# Patient Record
Sex: FEMALE | Race: WHITE | Employment: OTHER | URBAN - METROPOLITAN AREA
[De-identification: names, ages, dates, MRNs, and addresses within clinical notes are randomized per-mention and may not be internally consistent; named-entity substitution may affect disease eponyms.]

---

## 2017-04-06 ENCOUNTER — OFFICE VISIT (OUTPATIENT)
Dept: SURGERY | Age: 82
End: 2017-04-06

## 2017-04-06 VITALS
SYSTOLIC BLOOD PRESSURE: 110 MMHG | WEIGHT: 132 LBS | BODY MASS INDEX: 30.55 KG/M2 | HEIGHT: 55 IN | RESPIRATION RATE: 14 BRPM | TEMPERATURE: 98 F | DIASTOLIC BLOOD PRESSURE: 60 MMHG | HEART RATE: 72 BPM

## 2017-04-06 DIAGNOSIS — C50.919 MALIGNANT NEOPLASM OF BREAST (FEMALE), UNSPECIFIED SITE: Primary | ICD-10-CM

## 2017-04-06 PROCEDURE — 99213 OFFICE O/P EST LOW 20 MIN: CPT | Performed by: SURGERY

## 2017-04-06 ASSESSMENT — ENCOUNTER SYMPTOMS
CONSTIPATION: 0
BACK PAIN: 0
VOMITING: 0
COLOR CHANGE: 0
CHEST TIGHTNESS: 0
EYE DISCHARGE: 0
TROUBLE SWALLOWING: 0
ABDOMINAL PAIN: 0
ANAL BLEEDING: 0
STRIDOR: 0
EYE PAIN: 0
SHORTNESS OF BREATH: 0

## 2017-10-18 RX ORDER — TAMOXIFEN CITRATE 10 MG/1
10 TABLET ORAL DAILY
Qty: 90 TABLET | Refills: 1 | Status: SHIPPED | OUTPATIENT
Start: 2017-10-18 | End: 2018-07-10 | Stop reason: SDUPTHER

## 2018-01-09 ENCOUNTER — OFFICE VISIT (OUTPATIENT)
Dept: SURGERY | Age: 83
End: 2018-01-09

## 2018-01-09 VITALS
DIASTOLIC BLOOD PRESSURE: 60 MMHG | BODY MASS INDEX: 30.55 KG/M2 | HEIGHT: 55 IN | RESPIRATION RATE: 12 BRPM | SYSTOLIC BLOOD PRESSURE: 100 MMHG | WEIGHT: 132 LBS | HEART RATE: 72 BPM

## 2018-01-09 DIAGNOSIS — Z85.3 HISTORY OF LEFT BREAST CANCER: Primary | ICD-10-CM

## 2018-01-09 PROCEDURE — 99213 OFFICE O/P EST LOW 20 MIN: CPT | Performed by: SURGERY

## 2018-01-09 ASSESSMENT — ENCOUNTER SYMPTOMS
ABDOMINAL PAIN: 0
COLOR CHANGE: 0
EYE PAIN: 0
CHEST TIGHTNESS: 0
ANAL BLEEDING: 0
TROUBLE SWALLOWING: 0
STRIDOR: 0
SHORTNESS OF BREATH: 0
BACK PAIN: 0
VOMITING: 0
EYE DISCHARGE: 0
CONSTIPATION: 0

## 2018-03-13 ENCOUNTER — TELEPHONE (OUTPATIENT)
Dept: SURGERY | Age: 83
End: 2018-03-13

## 2018-03-13 RX ORDER — TAMOXIFEN CITRATE 10 MG/1
10 TABLET ORAL 2 TIMES DAILY
Qty: 180 TABLET | Refills: 1 | OUTPATIENT
Start: 2018-03-13 | End: 2018-07-10 | Stop reason: SDUPTHER

## 2018-07-10 ENCOUNTER — OFFICE VISIT (OUTPATIENT)
Dept: SURGERY | Age: 83
End: 2018-07-10
Payer: MEDICARE

## 2018-07-10 VITALS
WEIGHT: 133 LBS | BODY MASS INDEX: 30.78 KG/M2 | OXYGEN SATURATION: 93 % | TEMPERATURE: 97.8 F | DIASTOLIC BLOOD PRESSURE: 72 MMHG | HEART RATE: 73 BPM | SYSTOLIC BLOOD PRESSURE: 118 MMHG | HEIGHT: 55 IN

## 2018-07-10 DIAGNOSIS — Z85.3 HISTORY OF LEFT BREAST CANCER: Primary | ICD-10-CM

## 2018-07-10 PROCEDURE — 99213 OFFICE O/P EST LOW 20 MIN: CPT | Performed by: SURGERY

## 2018-07-10 RX ORDER — LISINOPRIL 10 MG/1
10 TABLET ORAL DAILY
COMMUNITY
End: 2019-06-25

## 2018-07-10 ASSESSMENT — ENCOUNTER SYMPTOMS
ANAL BLEEDING: 0
VOMITING: 0
ABDOMINAL PAIN: 0
EYE PAIN: 0
TROUBLE SWALLOWING: 0
CHEST TIGHTNESS: 0
SHORTNESS OF BREATH: 0
EYE DISCHARGE: 0
STRIDOR: 0
BACK PAIN: 0
COLOR CHANGE: 0
CONSTIPATION: 0

## 2018-07-10 NOTE — PROGRESS NOTES
Vidhya Davies      I have reviewed the Medical Assistant's entries in the Past Medical History, Medications, Allergies, Social History and Vital Sign sections      No chief complaint on file. HPI      Her for breast cancer FU      S/p let partial mastectomy   + AVERY      Feels fine    No breast complaitns    Denies breast changes, mass, discharge, node                                                  Past Medical History:   Diagnosis Date    COPD (chronic obstructive pulmonary disease) (HCC)     Dyslipidemia     Dyspnea     GERD (gastroesophageal reflux disease)     Hypertension     Kyphoscoliosis     LBBB (left bundle branch block)      Past Surgical History:   Procedure Laterality Date    APPENDECTOMY      BLADDER SURGERY      CARDIAC DEFIBRILLATOR PLACEMENT      GASTRIC RESTRICTION SURGERY      HYSTERECTOMY           Current problems include  Patient Active Problem List   Diagnosis    Dyspnea    Kyphoscoliosis    COPD (chronic obstructive pulmonary disease) (HCC)    Dyslipidemia    Hypertension    LBBB (left bundle branch block)    Malignant neoplasm of female breast [facility])           Social History     Social History    Marital status:      Spouse name: N/A    Number of children: N/A    Years of education: N/A     Social History Main Topics    Smoking status: Never Smoker    Smokeless tobacco: Never Used    Alcohol use Not on file    Drug use: Unknown    Sexual activity: Not on file     Other Topics Concern    Not on file     Social History Narrative    No narrative on file                 Family History   Problem Relation Age of Onset    Heart Disease Mother     Diabetes Mother     Heart Disease Father          Current Outpatient Prescriptions   Medication Sig Dispense Refill    lisinopril (PRINIVIL;ZESTRIL) 10 MG tablet Take 10 mg by mouth daily      tamoxifen (NOLVADEX) 10 MG chemo tablet Take 1 tablet by mouth 2 times daily.  30 tablet 0    albuterol

## 2018-08-22 RX ORDER — TAMOXIFEN CITRATE 10 MG/1
10 TABLET ORAL 2 TIMES DAILY
Qty: 180 TABLET | Refills: 3 | Status: SHIPPED | OUTPATIENT
Start: 2018-08-22

## 2019-06-25 ENCOUNTER — HOSPITAL ENCOUNTER (OUTPATIENT)
Dept: PREADMISSION TESTING | Age: 84
Discharge: HOME | End: 2019-06-29
Payer: MEDICARE

## 2019-06-25 VITALS
OXYGEN SATURATION: 94 % | HEIGHT: 55 IN | SYSTOLIC BLOOD PRESSURE: 157 MMHG | TEMPERATURE: 98.3 F | BODY MASS INDEX: 30.78 KG/M2 | RESPIRATION RATE: 14 BRPM | DIASTOLIC BLOOD PRESSURE: 69 MMHG | HEART RATE: 65 BPM | WEIGHT: 133 LBS

## 2019-06-25 PROBLEM — H91.93 BILATERAL HEARING LOSS: Status: ACTIVE | Noted: 2019-06-25

## 2019-06-25 LAB
ANION GAP SERPL CALCULATED.3IONS-SCNC: 16 MEQ/L (ref 9–15)
BUN BLDV-MCNC: 27 MG/DL (ref 8–23)
CALCIUM SERPL-MCNC: 8.7 MG/DL (ref 8.5–9.9)
CHLORIDE BLD-SCNC: 96 MEQ/L (ref 95–107)
CO2: 32 MEQ/L (ref 20–31)
CREAT SERPL-MCNC: 0.8 MG/DL (ref 0.5–0.9)
GFR AFRICAN AMERICAN: >60
GFR NON-AFRICAN AMERICAN: >60
GLUCOSE BLD-MCNC: 82 MG/DL (ref 70–99)
HCT VFR BLD CALC: 32.5 % (ref 37–47)
HEMOGLOBIN: 11 G/DL (ref 12–16)
MCH RBC QN AUTO: 30.6 PG (ref 27–31.3)
MCHC RBC AUTO-ENTMCNC: 34 % (ref 33–37)
MCV RBC AUTO: 90.2 FL (ref 82–100)
PDW BLD-RTO: 14.2 % (ref 11.5–14.5)
PLATELET # BLD: 227 K/UL (ref 130–400)
POTASSIUM SERPL-SCNC: 3.9 MEQ/L (ref 3.4–4.9)
RBC # BLD: 3.6 M/UL (ref 4.2–5.4)
SODIUM BLD-SCNC: 144 MEQ/L (ref 135–144)
WBC # BLD: 8.5 K/UL (ref 4.8–10.8)

## 2019-06-25 PROCEDURE — 85027 COMPLETE CBC AUTOMATED: CPT

## 2019-06-25 PROCEDURE — 80048 BASIC METABOLIC PNL TOTAL CA: CPT

## 2019-06-25 RX ORDER — SODIUM CHLORIDE 0.9 % (FLUSH) 0.9 %
10 SYRINGE (ML) INJECTION EVERY 12 HOURS SCHEDULED
Status: CANCELLED | OUTPATIENT
Start: 2019-06-27

## 2019-06-25 RX ORDER — LIDOCAINE 50 MG/G
1 PATCH TOPICAL DAILY
COMMUNITY

## 2019-06-25 RX ORDER — SODIUM CHLORIDE 0.9 % (FLUSH) 0.9 %
10 SYRINGE (ML) INJECTION PRN
Status: CANCELLED | OUTPATIENT
Start: 2019-06-27

## 2019-06-25 RX ORDER — LIDOCAINE HYDROCHLORIDE 10 MG/ML
1 INJECTION, SOLUTION EPIDURAL; INFILTRATION; INTRACAUDAL; PERINEURAL
Status: CANCELLED | OUTPATIENT
Start: 2019-06-27 | End: 2019-06-27

## 2019-06-25 RX ORDER — FUROSEMIDE 20 MG/1
40 TABLET ORAL DAILY
COMMUNITY

## 2019-06-25 RX ORDER — ACETAMINOPHEN 325 MG/1
650 TABLET ORAL EVERY 6 HOURS PRN
COMMUNITY

## 2019-06-25 RX ORDER — SODIUM CHLORIDE, SODIUM LACTATE, POTASSIUM CHLORIDE, CALCIUM CHLORIDE 600; 310; 30; 20 MG/100ML; MG/100ML; MG/100ML; MG/100ML
INJECTION, SOLUTION INTRAVENOUS CONTINUOUS
Status: CANCELLED | OUTPATIENT
Start: 2019-06-27

## 2019-06-25 ASSESSMENT — ENCOUNTER SYMPTOMS
DIARRHEA: 0
CONSTIPATION: 0
BACK PAIN: 0
COUGH: 0
WHEEZING: 0
CHEST TIGHTNESS: 0
STRIDOR: 0
SORE THROAT: 0
NAUSEA: 0
ALLERGIC/IMMUNOLOGIC NEGATIVE: 1
SHORTNESS OF BREATH: 1

## 2019-06-25 NOTE — H&P
Nurse Practitioner History and Physical      CHIEF COMPLAINT:  [place]    HISTORY OF PRESENT ILLNESS:      The patient is a 80 y.o. female with significant past medical history of hearing loss, otitis media, tinnitus who presents for bilateral myringotomy with ventilating tube insertion. Patient is extremely [place]. Does not wear hearing aides. Daughter was with patient for PAT appt. Scheduled for OR    Past Medical History:        Diagnosis Date    CAD (coronary artery disease)     has x 8 stents     COPD (chronic obstructive pulmonary disease) (HCC)     Dyslipidemia     Dyspnea     GERD (gastroesophageal reflux disease)     Hyperlipidemia     meds > 25 yrs    Hypertension     meds > 25 yrs    Kyphoscoliosis     LBBB (left bundle branch block)      Past Surgical History:    Past Surgical History:   Procedure Laterality Date    APPENDECTOMY      BLADDER SURGERY      CARDIAC DEFIBRILLATOR PLACEMENT      GASTRIC RESTRICTION SURGERY      HYSTERECTOMY           Medications Prior to Admission:    Current Outpatient Medications   Medication Sig Dispense Refill    furosemide (LASIX) 20 MG tablet Take 20 mg by mouth daily      vitamin D (CHOLECALCIFEROL) 1000 UNIT TABS tablet Take 1,000 Units by mouth daily      lidocaine (LIDODERM) 5 % Place 1 patch onto the skin daily 12 hours on, 12 hours off.  acetaminophen (TYLENOL) 325 MG tablet Take 650 mg by mouth every 6 hours as needed for Pain      tamoxifen (NOLVADEX) 10 MG tablet Take 1 tablet by mouth 2 times daily 180 tablet 3    diltiazem (DILACOR XR) 240 MG XR capsule Take 240 mg by mouth daily      albuterol (PROVENTIL) (2.5 MG/3ML) 0.083% nebulizer solution Take 2.5 mg by nebulization every 6 hours as needed.  aspirin 81 MG tablet Take 81 mg by mouth 2 times daily.  carvedilol (COREG) 25 MG tablet Take 25 mg by mouth 2 times daily (with meals).  lovastatin (MEVACOR) 40 MG tablet Take 40 mg by mouth nightly.       magnesium hydroxide (MILK OF MAGNESIA) 400 MG/5ML suspension Take  by mouth daily as needed.  nitroGLYCERIN (NITROSTAT) 0.4 MG SL tablet Place 0.4 mg under the tongue every 5 minutes as needed.  Multiple Vitamins-Minerals (OCUVITE ADULT 50+ PO) Take  by mouth.  ramipril (ALTACE) 10 MG capsule Take 10 mg by mouth every evening       omeprazole (PRILOSEC) 20 MG capsule Take 40 mg by mouth daily. No current facility-administered medications for this encounter. Allergies:  Adhesive tape    Social History:   Social History     Socioeconomic History    Marital status:       Spouse name: Not on file    Number of children: Not on file    Years of education: Not on file    Highest education level: Not on file   Occupational History    Not on file   Social Needs    Financial resource strain: Not on file    Food insecurity:     Worry: Not on file     Inability: Not on file    Transportation needs:     Medical: Not on file     Non-medical: Not on file   Tobacco Use    Smoking status: Never Smoker    Smokeless tobacco: Never Used   Substance and Sexual Activity    Alcohol use: Not Currently     Comment: holiday alcohol    Drug use: Never    Sexual activity: Not on file   Lifestyle    Physical activity:     Days per week: Not on file     Minutes per session: Not on file    Stress: Not on file   Relationships    Social connections:     Talks on phone: Not on file     Gets together: Not on file     Attends Rastafarian service: Not on file     Active member of club or organization: Not on file     Attends meetings of clubs or organizations: Not on file     Relationship status: Not on file    Intimate partner violence:     Fear of current or ex partner: Not on file     Emotionally abused: Not on file     Physically abused: Not on file     Forced sexual activity: Not on file   Other Topics Concern    Not on file   Social History Narrative    Not on file       Family History:       Problem Relation Age of Onset    Heart Disease Mother     Diabetes Mother     Heart Disease Father        Review of Systems   Constitutional: Negative. Negative for chills and fever. HENT: Positive for congestion and hearing loss. Negative for sore throat. Eyes: Positive for visual disturbance (macular degeneration OU). Respiratory: Positive for shortness of breath. Negative for cough, chest tightness, wheezing and stridor. Cardiovascular: Negative for chest pain and palpitations. Gastrointestinal: Negative for constipation, diarrhea and nausea. Endocrine: Negative. Genitourinary: Negative for dysuria and frequency. Urgency   Musculoskeletal: Negative for back pain, myalgias and neck pain. Arthritis   Skin: Negative. Allergic/Immunologic: Negative. Neurological: Negative. Negative for seizures and headaches. Hematological: Negative. Psychiatric/Behavioral: Negative. Vitals:  BP (!) 157/69   Pulse 65   Temp 98.3 °F (36.8 °C) (Temporal)   Resp 14   Ht 4' 7\" (1.397 m)   Wt 133 lb (60.3 kg)   SpO2 94% Comment: oxygen at 3 liters via nasal cannula  BMI 30.91 kg/m²     Physical Exam   Constitutional: She is oriented to person, place, and time. She appears well-developed and well-nourished. HENT:   Head: Normocephalic and atraumatic. Mouth/Throat: No oropharyngeal exudate. Ceruminous ear canals bilateral / partially visualized TM bilateral with scarring. Dry oral mucosa. Eyes: Pupils are equal, round, and reactive to light. Conjunctivae and EOM are normal. No scleral icterus. IOL OU   Neck: Normal range of motion. No JVD present. No tracheal deviation present. No thyromegaly present. Cardiovascular: Normal rate, regular rhythm and normal heart sounds. Exam reveals no gallop. No murmur heard. Pulmonary/Chest: Effort normal and breath sounds normal. No respiratory distress. She has no wheezes. She has no rales. ICD palpated left upper anterior chest.   Abdominal: Soft. Bowel sounds are normal. There is no tenderness. Genitourinary:   Genitourinary Comments: Deferred. Musculoskeletal: Normal range of motion. She exhibits edema (bilateral ankle edema. ). Large right mid thoracic hump. Ambulates with wheeled walker. Lymphadenopathy:     She has no cervical adenopathy. Neurological: She is alert and oriented to person, place, and time. Skin: Skin is warm and dry. No erythema. Psychiatric: She has a normal mood and affect. Her behavior is normal. Judgment and thought content normal.       [unfilled]    Assessment:  Patient Active Problem List   Diagnosis    Dyspnea    Kyphoscoliosis    COPD (chronic obstructive pulmonary disease) ([address]    Dyslipidemia    Hypertension    LBBB (left bundle branch block)    Malignant neoplasm of female breast ([address]    ARMD (age related macular degeneration)    Anatomical narrow angle borderline glaucoma    Bilateral hearing loss         Plan:  Scheduled for bilateral myringotomy with ventilating tube insertion.     Elaine Burch, DIMITRI - CNP  6/25/2019  4:14 PM

## 2019-06-25 NOTE — H&P (VIEW-ONLY)
Nurse Practitioner History and Physical      CHIEF COMPLAINT:  [place]    HISTORY OF PRESENT ILLNESS:      The patient is a 80 y.o. female with significant past medical history of hearing loss, otitis media, tinnitus who presents for bilateral myringotomy with ventilating tube insertion. Patient is extremely [place]. Does not wear hearing aides. Daughter was with patient for PAT appt. Scheduled for OR    Past Medical History:        Diagnosis Date    CAD (coronary artery disease)     has x 8 stents     COPD (chronic obstructive pulmonary disease) (HCC)     Dyslipidemia     Dyspnea     GERD (gastroesophageal reflux disease)     Hyperlipidemia     meds > 25 yrs    Hypertension     meds > 25 yrs    Kyphoscoliosis     LBBB (left bundle branch block)      Past Surgical History:    Past Surgical History:   Procedure Laterality Date    APPENDECTOMY      BLADDER SURGERY      CARDIAC DEFIBRILLATOR PLACEMENT      GASTRIC RESTRICTION SURGERY      HYSTERECTOMY           Medications Prior to Admission:    Current Outpatient Medications   Medication Sig Dispense Refill    furosemide (LASIX) 20 MG tablet Take 20 mg by mouth daily      vitamin D (CHOLECALCIFEROL) 1000 UNIT TABS tablet Take 1,000 Units by mouth daily      lidocaine (LIDODERM) 5 % Place 1 patch onto the skin daily 12 hours on, 12 hours off.  acetaminophen (TYLENOL) 325 MG tablet Take 650 mg by mouth every 6 hours as needed for Pain      tamoxifen (NOLVADEX) 10 MG tablet Take 1 tablet by mouth 2 times daily 180 tablet 3    diltiazem (DILACOR XR) 240 MG XR capsule Take 240 mg by mouth daily      albuterol (PROVENTIL) (2.5 MG/3ML) 0.083% nebulizer solution Take 2.5 mg by nebulization every 6 hours as needed.  aspirin 81 MG tablet Take 81 mg by mouth 2 times daily.  carvedilol (COREG) 25 MG tablet Take 25 mg by mouth 2 times daily (with meals).  lovastatin (MEVACOR) 40 MG tablet Take 40 mg by mouth nightly.       magnesium hydroxide (MILK OF MAGNESIA) 400 MG/5ML suspension Take  by mouth daily as needed.  nitroGLYCERIN (NITROSTAT) 0.4 MG SL tablet Place 0.4 mg under the tongue every 5 minutes as needed.  Multiple Vitamins-Minerals (OCUVITE ADULT 50+ PO) Take  by mouth.  ramipril (ALTACE) 10 MG capsule Take 10 mg by mouth every evening       omeprazole (PRILOSEC) 20 MG capsule Take 40 mg by mouth daily. No current facility-administered medications for this encounter. Allergies:  Adhesive tape    Social History:   Social History     Socioeconomic History    Marital status:       Spouse name: Not on file    Number of children: Not on file    Years of education: Not on file    Highest education level: Not on file   Occupational History    Not on file   Social Needs    Financial resource strain: Not on file    Food insecurity:     Worry: Not on file     Inability: Not on file    Transportation needs:     Medical: Not on file     Non-medical: Not on file   Tobacco Use    Smoking status: Never Smoker    Smokeless tobacco: Never Used   Substance and Sexual Activity    Alcohol use: Not Currently     Comment: holiday alcohol    Drug use: Never    Sexual activity: Not on file   Lifestyle    Physical activity:     Days per week: Not on file     Minutes per session: Not on file    Stress: Not on file   Relationships    Social connections:     Talks on phone: Not on file     Gets together: Not on file     Attends Nondenominational service: Not on file     Active member of club or organization: Not on file     Attends meetings of clubs or organizations: Not on file     Relationship status: Not on file    Intimate partner violence:     Fear of current or ex partner: Not on file     Emotionally abused: Not on file     Physically abused: Not on file     Forced sexual activity: Not on file   Other Topics Concern    Not on file   Social History Narrative    Not on file       Family History:       Problem Relation Age of Onset    Heart Disease Mother     Diabetes Mother     Heart Disease Father        Review of Systems   Constitutional: Negative. Negative for chills and fever. HENT: Positive for congestion and hearing loss. Negative for sore throat. Eyes: Positive for visual disturbance (macular degeneration OU). Respiratory: Positive for shortness of breath. Negative for cough, chest tightness, wheezing and stridor. Cardiovascular: Negative for chest pain and palpitations. Gastrointestinal: Negative for constipation, diarrhea and nausea. Endocrine: Negative. Genitourinary: Negative for dysuria and frequency. Urgency   Musculoskeletal: Negative for back pain, myalgias and neck pain. Arthritis   Skin: Negative. Allergic/Immunologic: Negative. Neurological: Negative. Negative for seizures and headaches. Hematological: Negative. Psychiatric/Behavioral: Negative. Vitals:  BP (!) 157/69   Pulse 65   Temp 98.3 °F (36.8 °C) (Temporal)   Resp 14   Ht 4' 7\" (1.397 m)   Wt 133 lb (60.3 kg)   SpO2 94% Comment: oxygen at 3 liters via nasal cannula  BMI 30.91 kg/m²     Physical Exam   Constitutional: She is oriented to person, place, and time. She appears well-developed and well-nourished. HENT:   Head: Normocephalic and atraumatic. Mouth/Throat: No oropharyngeal exudate. Ceruminous ear canals bilateral / partially visualized TM bilateral with scarring. Dry oral mucosa. Eyes: Pupils are equal, round, and reactive to light. Conjunctivae and EOM are normal. No scleral icterus. IOL OU   Neck: Normal range of motion. No JVD present. No tracheal deviation present. No thyromegaly present. Cardiovascular: Normal rate, regular rhythm and normal heart sounds. Exam reveals no gallop. No murmur heard. Pulmonary/Chest: Effort normal and breath sounds normal. No respiratory distress. She has no wheezes. She has no rales. ICD palpated left upper anterior chest.   Abdominal: Soft. Bowel sounds are normal. There is no tenderness. Genitourinary:   Genitourinary Comments: Deferred. Musculoskeletal: Normal range of motion. She exhibits edema (bilateral ankle edema. ). Large right mid thoracic hump. Ambulates with wheeled walker. Lymphadenopathy:     She has no cervical adenopathy. Neurological: She is alert and oriented to person, place, and time. Skin: Skin is warm and dry. No erythema. Psychiatric: She has a normal mood and affect. Her behavior is normal. Judgment and thought content normal.       [unfilled]    Assessment:  Patient Active Problem List   Diagnosis    Dyspnea    Kyphoscoliosis    [facility]) [facility]    Dyslipidemia    Hypertension    LBBB (left bundle branch block)    Malignant neoplasm of female breast ([facility]    ARMD (age related macular degeneration)    Anatomical narrow angle borderline glaucoma    Bilateral hearing loss         Plan:  Scheduled for bilateral myringotomy with ventilating tube insertion.     DIMITRI Pineda - CNP  6/25/2019  4:14 PM

## 2019-06-27 ENCOUNTER — ANESTHESIA EVENT (OUTPATIENT)
Dept: OPERATING ROOM | Age: 84
End: 2019-06-27
Payer: MEDICARE

## 2019-06-27 ENCOUNTER — HOSPITAL ENCOUNTER (OUTPATIENT)
Age: 84
Setting detail: OUTPATIENT SURGERY
Discharge: HOME | End: 2019-06-27
Attending: OTOLARYNGOLOGY | Admitting: OTOLARYNGOLOGY
Payer: MEDICARE

## 2019-06-27 ENCOUNTER — ANESTHESIA (OUTPATIENT)
Dept: OPERATING ROOM | Age: 84
End: 2019-06-27
Payer: MEDICARE

## 2019-06-27 VITALS — SYSTOLIC BLOOD PRESSURE: 101 MMHG | OXYGEN SATURATION: 100 % | DIASTOLIC BLOOD PRESSURE: 53 MMHG

## 2019-06-27 VITALS
HEART RATE: 66 BPM | SYSTOLIC BLOOD PRESSURE: 148 MMHG | BODY MASS INDEX: 30.78 KG/M2 | DIASTOLIC BLOOD PRESSURE: 67 MMHG | WEIGHT: 133 LBS | TEMPERATURE: 98.3 F | OXYGEN SATURATION: 95 % | RESPIRATION RATE: 16 BRPM | HEIGHT: 55 IN

## 2019-06-27 PROCEDURE — 7100000011 HC PHASE II RECOVERY - ADDTL 15 MIN: Performed by: OTOLARYNGOLOGY

## 2019-06-27 PROCEDURE — 2709999900 HC NON-CHARGEABLE SUPPLY: Performed by: OTOLARYNGOLOGY

## 2019-06-27 PROCEDURE — 7100000001 HC PACU RECOVERY - ADDTL 15 MIN: Performed by: OTOLARYNGOLOGY

## 2019-06-27 PROCEDURE — 3600000012 HC SURGERY LEVEL 2 ADDTL 15MIN: Performed by: OTOLARYNGOLOGY

## 2019-06-27 PROCEDURE — 3600000002 HC SURGERY LEVEL 2 BASE: Performed by: OTOLARYNGOLOGY

## 2019-06-27 PROCEDURE — 6360000002 HC RX W HCPCS: Performed by: NURSE ANESTHETIST, CERTIFIED REGISTERED

## 2019-06-27 PROCEDURE — 2580000003 HC RX 258: Performed by: NURSE PRACTITIONER

## 2019-06-27 PROCEDURE — 3700000000 HC ANESTHESIA ATTENDED CARE: Performed by: OTOLARYNGOLOGY

## 2019-06-27 PROCEDURE — 6370000000 HC RX 637 (ALT 250 FOR IP): Performed by: OTOLARYNGOLOGY

## 2019-06-27 PROCEDURE — 2500000003 HC RX 250 WO HCPCS: Performed by: NURSE PRACTITIONER

## 2019-06-27 PROCEDURE — 7100000010 HC PHASE II RECOVERY - FIRST 15 MIN: Performed by: OTOLARYNGOLOGY

## 2019-06-27 PROCEDURE — 7100000000 HC PACU RECOVERY - FIRST 15 MIN: Performed by: OTOLARYNGOLOGY

## 2019-06-27 PROCEDURE — 3700000001 HC ADD 15 MINUTES (ANESTHESIA): Performed by: OTOLARYNGOLOGY

## 2019-06-27 PROCEDURE — 6360000002 HC RX W HCPCS: Performed by: ANESTHESIOLOGY

## 2019-06-27 PROCEDURE — 2780000010 HC IMPLANT OTHER: Performed by: OTOLARYNGOLOGY

## 2019-06-27 DEVICE — TUBE EAR VENTILATION 1.14X7.5MM: Type: IMPLANTABLE DEVICE | Site: EAR | Status: FUNCTIONAL

## 2019-06-27 RX ORDER — PROPOFOL 10 MG/ML
INJECTION, EMULSION INTRAVENOUS PRN
Status: DISCONTINUED | OUTPATIENT
Start: 2019-06-27 | End: 2019-06-27 | Stop reason: SDUPTHER

## 2019-06-27 RX ORDER — SODIUM CHLORIDE 0.9 % (FLUSH) 0.9 %
10 SYRINGE (ML) INJECTION PRN
Status: DISCONTINUED | OUTPATIENT
Start: 2019-06-27 | End: 2019-06-27 | Stop reason: HOSPADM

## 2019-06-27 RX ORDER — SODIUM CHLORIDE, SODIUM LACTATE, POTASSIUM CHLORIDE, CALCIUM CHLORIDE 600; 310; 30; 20 MG/100ML; MG/100ML; MG/100ML; MG/100ML
INJECTION, SOLUTION INTRAVENOUS CONTINUOUS
Status: DISCONTINUED | OUTPATIENT
Start: 2019-06-27 | End: 2019-06-27 | Stop reason: HOSPADM

## 2019-06-27 RX ORDER — SODIUM CHLORIDE 0.9 % (FLUSH) 0.9 %
10 SYRINGE (ML) INJECTION EVERY 12 HOURS SCHEDULED
Status: CANCELLED | OUTPATIENT
Start: 2019-06-27

## 2019-06-27 RX ORDER — ONDANSETRON 2 MG/ML
4 INJECTION INTRAMUSCULAR; INTRAVENOUS
Status: DISCONTINUED | OUTPATIENT
Start: 2019-06-27 | End: 2019-06-27 | Stop reason: HOSPADM

## 2019-06-27 RX ORDER — SODIUM CHLORIDE 0.9 % (FLUSH) 0.9 %
10 SYRINGE (ML) INJECTION PRN
Status: CANCELLED | OUTPATIENT
Start: 2019-06-27

## 2019-06-27 RX ORDER — METOCLOPRAMIDE HYDROCHLORIDE 5 MG/ML
10 INJECTION INTRAMUSCULAR; INTRAVENOUS
Status: DISCONTINUED | OUTPATIENT
Start: 2019-06-27 | End: 2019-06-27 | Stop reason: HOSPADM

## 2019-06-27 RX ORDER — MEPERIDINE HYDROCHLORIDE 25 MG/ML
12.5 INJECTION INTRAMUSCULAR; INTRAVENOUS; SUBCUTANEOUS EVERY 5 MIN PRN
Status: DISCONTINUED | OUTPATIENT
Start: 2019-06-27 | End: 2019-06-27 | Stop reason: HOSPADM

## 2019-06-27 RX ORDER — LIDOCAINE HYDROCHLORIDE 20 MG/ML
INJECTION, SOLUTION INTRAVENOUS PRN
Status: DISCONTINUED | OUTPATIENT
Start: 2019-06-27 | End: 2019-06-27 | Stop reason: SDUPTHER

## 2019-06-27 RX ORDER — SODIUM CHLORIDE 0.9 % (FLUSH) 0.9 %
10 SYRINGE (ML) INJECTION EVERY 12 HOURS SCHEDULED
Status: DISCONTINUED | OUTPATIENT
Start: 2019-06-27 | End: 2019-06-27 | Stop reason: HOSPADM

## 2019-06-27 RX ORDER — FENTANYL CITRATE 50 UG/ML
50 INJECTION, SOLUTION INTRAMUSCULAR; INTRAVENOUS EVERY 10 MIN PRN
Status: DISCONTINUED | OUTPATIENT
Start: 2019-06-27 | End: 2019-06-27 | Stop reason: HOSPADM

## 2019-06-27 RX ORDER — LIDOCAINE HYDROCHLORIDE 10 MG/ML
1 INJECTION, SOLUTION EPIDURAL; INFILTRATION; INTRACAUDAL; PERINEURAL
Status: COMPLETED | OUTPATIENT
Start: 2019-06-27 | End: 2019-06-27

## 2019-06-27 RX ADMIN — SODIUM CHLORIDE, POTASSIUM CHLORIDE, SODIUM LACTATE AND CALCIUM CHLORIDE: 600; 310; 30; 20 INJECTION, SOLUTION INTRAVENOUS at 07:26

## 2019-06-27 RX ADMIN — PHENYLEPHRINE HYDROCHLORIDE 100 MCG: 10 INJECTION INTRAVENOUS at 08:35

## 2019-06-27 RX ADMIN — PHENYLEPHRINE HYDROCHLORIDE 100 MCG: 10 INJECTION INTRAVENOUS at 08:48

## 2019-06-27 RX ADMIN — FENTANYL CITRATE 25 MCG: 50 INJECTION, SOLUTION INTRAMUSCULAR; INTRAVENOUS at 09:23

## 2019-06-27 RX ADMIN — LIDOCAINE HYDROCHLORIDE 60 MG: 20 INJECTION, SOLUTION INTRAVENOUS at 08:20

## 2019-06-27 RX ADMIN — PHENYLEPHRINE HYDROCHLORIDE 100 MCG: 10 INJECTION INTRAVENOUS at 08:39

## 2019-06-27 RX ADMIN — PHENYLEPHRINE HYDROCHLORIDE 100 MCG: 10 INJECTION INTRAVENOUS at 08:29

## 2019-06-27 RX ADMIN — LIDOCAINE HYDROCHLORIDE 0.1 ML: 10 INJECTION, SOLUTION EPIDURAL; INFILTRATION; INTRACAUDAL; PERINEURAL at 07:25

## 2019-06-27 RX ADMIN — PROPOFOL 50 MG: 10 INJECTION, EMULSION INTRAVENOUS at 08:20

## 2019-06-27 ASSESSMENT — PAIN DESCRIPTION - PAIN TYPE
TYPE: SURGICAL PAIN

## 2019-06-27 ASSESSMENT — PULMONARY FUNCTION TESTS
PIF_VALUE: 3
PIF_VALUE: 14
PIF_VALUE: 2
PIF_VALUE: 1
PIF_VALUE: 2
PIF_VALUE: 14
PIF_VALUE: 1
PIF_VALUE: 3
PIF_VALUE: 16
PIF_VALUE: 10
PIF_VALUE: 1
PIF_VALUE: 10
PIF_VALUE: 1
PIF_VALUE: 11
PIF_VALUE: 14
PIF_VALUE: 14
PIF_VALUE: 11
PIF_VALUE: 21
PIF_VALUE: 21
PIF_VALUE: 15
PIF_VALUE: 2
PIF_VALUE: 13
PIF_VALUE: 15
PIF_VALUE: 12
PIF_VALUE: 1
PIF_VALUE: 16
PIF_VALUE: 1
PIF_VALUE: 1
PIF_VALUE: 10
PIF_VALUE: 14
PIF_VALUE: 8
PIF_VALUE: 11
PIF_VALUE: 10
PIF_VALUE: 15
PIF_VALUE: 1
PIF_VALUE: 11
PIF_VALUE: 10
PIF_VALUE: 10
PIF_VALUE: 1

## 2019-06-27 ASSESSMENT — PAIN DESCRIPTION - ORIENTATION
ORIENTATION: RIGHT;LEFT

## 2019-06-27 ASSESSMENT — PAIN DESCRIPTION - LOCATION
LOCATION: EAR

## 2019-06-27 ASSESSMENT — PAIN - FUNCTIONAL ASSESSMENT: PAIN_FUNCTIONAL_ASSESSMENT: 0-10

## 2019-06-27 ASSESSMENT — ENCOUNTER SYMPTOMS: SHORTNESS OF BREATH: 1

## 2019-06-27 ASSESSMENT — PAIN SCALES - GENERAL
PAINLEVEL_OUTOF10: 4
PAINLEVEL_OUTOF10: 7
PAINLEVEL_OUTOF10: 5
PAINLEVEL_OUTOF10: 2
PAINLEVEL_OUTOF10: 7

## 2019-06-27 NOTE — BRIEF OP NOTE
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 Item Serial No.  Lot No. LRB No. Used   TUBE EAR VENTILATION 1.14X7. 5MM Ear TUBE EAR VENTILATION 1.14X7. 5MM  [employer] [id number] Right 1   TUBE EAR VENTILATION 1.14X7. 5MM Ear TUBE EAR VENTILATION 1.14X7. 5MM  [employer] [id number] Left 1         Drains: * No LDAs found *    Findings: effusion in both Mary Ann Veras MD  Date: 6/27/2019  Time: 9:10 AM

## 2019-06-27 NOTE — ANESTHESIA POSTPROCEDURE EVALUATION
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        Anesthesia Post Evaluation    Patient location during evaluation: PACU  Patient participation: complete - patient participated  Level of consciousness: awake and awake and alert  Pain score: 0  Airway patency: patent  Nausea & Vomiting: no nausea and no vomiting  Complications: no  Cardiovascular status: blood pressure returned to baseline and hemodynamically stable  Respiratory status: acceptable  Hydration status: euvolemic

## 2019-06-27 NOTE — ANESTHESIA PRE PROCEDURE
Department of Anesthesiology  Preprocedure Note       Name:  Toyin Hoyos   Age:  80 y.o.  :  1925                                          MRN:  [id number]         Date:  2019      Surgeon: Yamel Wan):  Kiana Sam MD    Procedure: BILATERAL MYRINGOTOMY WITH VENTILATING  TUBE INSERTION  TO BE LATEX FREE (Bilateral )    Medications prior to admission:   Prior to Admission medications    Medication Sig Start Date End Date Taking? Authorizing Provider   furosemide (LASIX) 20 MG tablet Take 20 mg by mouth daily   Yes Historical Provider, MD   acetaminophen (TYLENOL) 325 MG tablet Take 650 mg by mouth every 6 hours as needed for Pain   Yes Historical Provider, MD   tamoxifen (NOLVADEX) 10 MG tablet Take 1 tablet by mouth 2 times daily 18  Yes Kiera Irizarry MD   diltiazem (DILACOR XR) 240 MG XR capsule Take 240 mg by mouth daily   Yes Historical Provider, MD   albuterol (PROVENTIL) (2.5 MG/3ML) 0.083% nebulizer solution Take 2.5 mg by nebulization every 6 hours as needed. Yes Historical Provider, MD   carvedilol (COREG) 25 MG tablet Take 25 mg by mouth 2 times daily (with meals). Yes Historical Provider, MD   lovastatin (MEVACOR) 40 MG tablet Take 40 mg by mouth nightly. Yes Historical Provider, MD   magnesium hydroxide (MILK OF MAGNESIA) 400 MG/5ML suspension Take  by mouth daily as needed. Yes Historical Provider, MD   ramipril (ALTACE) 10 MG capsule Take 10 mg by mouth every evening    Yes Historical Provider, MD   vitamin D (CHOLECALCIFEROL) 1000 UNIT TABS tablet Take 1,000 Units by mouth daily    Historical Provider, MD   lidocaine (LIDODERM) 5 % Place 1 patch onto the skin daily 12 hours on, 12 hours off. Historical Provider, MD   aspirin 81 MG tablet Take 81 mg by mouth 2 times daily. Historical Provider, MD   nitroGLYCERIN (NITROSTAT) 0.4 MG SL tablet Place 0.4 mg under the tongue every 5 minutes as needed.     Historical Provider, MD   Multiple Vitamins-Minerals (OCUVITE ADULT 50+ PO) Take  by mouth. Historical Provider, MD   omeprazole (PRILOSEC) 20 MG capsule Take 40 mg by mouth daily. Historical Provider, MD       Current medications:    Current Facility-Administered Medications   Medication Dose Route Frequency Provider Last Rate Last Dose    lactated ringers infusion   Intravenous Continuous DIMITRI Nieto -  mL/hr at 06/27/19 0726      sodium chloride flush 0.9 % injection 10 mL  10 mL Intravenous 2 times per day Clovis Thakkar APRN - CNP        sodium chloride flush 0.9 % injection 10 mL  10 mL Intravenous PRN Clovis Thakkar APRN - CNP           Allergies:     Allergies   Allergen Reactions    Adhesive Tape Other (See Comments)     Skin irritation       Problem List:    Patient Active Problem List   Diagnosis Code    Dyspnea R06.00    Kyphoscoliosis M41.9    COPD (chronic obstructive pulmonary disease) ([facility] J44.9    Dyslipidemia E78.5    Hypertension I10    LBBB (left bundle branch block) I44.7    Malignant neoplasm of female breast ([facility] C50.18    ARMD (age related macular degeneration) H35.30    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    Bilateral hearing loss H91.93       Past Medical History:        Diagnosis Date    CAD (coronary artery disease)     has x 8 stents     [facility] ([facility] 2011    left breast cancer / OR excision / Tamoxifen    COPD (chronic obstructive pulmonary disease) (HCC)     never smoked    Dyslipidemia     Dyspnea     GERD (gastroesophageal reflux disease)     History of blood transfusion     at age [de-identified]    Hyperlipidemia     meds > 25 yrs    Hypertension     meds > 25 yrs    Kyphoscoliosis     LBBB (left bundle branch block)        Past Surgical History:        Procedure Laterality Date    ABDOMEN SURGERY  2014    intestinal OR due to a blockage    APPENDECTOMY      BLADDER SURGERY      suspension    BREAST SURGERY  2011    left breast OR due to cancer / tamoxifen    CARDIAC DEFIBRILLATOR PLACEMENT      COLONOSCOPY      EYE SURGERY      Phaco with IOL OU    HYSTERECTOMY         Social History:    Social History     Tobacco Use    Smoking status: Never Smoker    Smokeless tobacco: Never Used   Substance Use Topics    Alcohol use: Not Currently     Comment: holiday alcohol                                Counseling given: Not Answered      Vital Signs (Current):   Vitals:    06/27/19 0713   BP: (!) 170/71   Pulse: 70   Resp: 16   Temp: 98.2 °F (36.8 °C)   TempSrc: Temporal   SpO2: 98%   Weight: 133 lb (60.3 kg)   Height: 4' 7\" (1.397 m)                                              BP Readings from Last 3 Encounters:   06/27/19 (!) 170/71   06/25/19 (!) 157/69   07/10/18 118/72       NPO Status: Time of last liquid consumption: 2200                        Time of last solid consumption: 1800                        Date of last liquid consumption: 06/26/19                        Date of last solid food consumption: 06/26/19    BMI:   Wt Readings from Last 3 Encounters:   06/27/19 133 lb (60.3 kg)   06/25/19 133 lb (60.3 kg)   07/10/18 133 lb (60.3 kg)     Body mass index is 30.91 kg/m². CBC:   Lab Results   Component Value Date    WBC 8.5 06/25/2019    RBC 3.60 06/25/2019    HGB 11.0 06/25/2019    HCT 32.5 06/25/2019    MCV 90.2 06/25/2019    RDW 14.2 06/25/2019     06/25/2019       CMP:   Lab Results   Component Value Date     06/25/2019    K 3.9 06/25/2019    CL 96 06/25/2019    CO2 32 06/25/2019    BUN 27 06/25/2019    CREATININE 0.80 06/25/2019    GFRAA >60.0 06/25/2019    LABGLOM >60.0 06/25/2019    GLUCOSE 82 06/25/2019    PROT 5.5 05/23/2018    CALCIUM 8.7 06/25/2019    BILITOT <0.2 05/23/2018    ALKPHOS 51 05/23/2018    AST 10 05/23/2018    ALT 10 05/23/2018       POC Tests: No results for input(s): POCGLU, POCNA, POCK, POCCL, POCBUN, POCHEMO, POCHCT in the last 72 hours. Coags: No results found for: PROTIME, INR, APTT    HCG (If Applicable):  No results found for: PREGTESTUR, PREGSERUM, HCG, HCGQUANT     ABGs: No results found for: PHART, PO2ART, BYB3HRI, SAV5PJR, BEART, N8PIVHHR     Type & Screen (If Applicable):  No results found for: LABABO, LABRH    Anesthesia Evaluation    Airway: Mallampati: II  TM distance: >3 FB   Neck ROM: full  Mouth opening: > = 3 FB Dental: normal exam         Pulmonary: breath sounds clear to auscultation  (+) COPD:  shortness of breath: recurrent,                            ROS comment: On oxygen at home   Cardiovascular:    (+) hypertension:, pacemaker: AICD and pacemaker, CAD:,         Rhythm: regular                      Neuro/Psych:   Negative Neuro/Psych ROS              GI/Hepatic/Renal:   (+) GERD:,           Endo/Other: Negative Endo/Other ROS                    Abdominal:           Vascular: negative vascular ROS. Anesthesia Plan      MAC     ASA 3       Induction: intravenous. MIPS: Postoperative opioids intended and Prophylactic antiemetics administered. Anesthetic plan and risks discussed with patient. Plan discussed with CRNA.     Attending anesthesiologist reviewed and agrees with Pre Eval content              Kerline Molina MD   6/27/2019

## 2019-06-27 NOTE — OP NOTE
[address]                      [address], [address] [facility]                                OPERATIVE REPORT    PATIENT NAME: Ish Tate                  :        1925  MED REC NO:   [id number]                            ROOM:  ACCOUNT NO:   [de-identified]                           ADMIT DATE: 2019  PROVIDER:     Gilda Mason MD    DATE OF PROCEDURE:  2019    PREOPERATIVE DIAGNOSES:  Bilateral chronic otitis media with effusion  and mixed with sensorineural hearing loss. POSTOPERATIVE DIAGNOSES:  Bilateral chronic otitis media with effusion  and mixed with sensorineural hearing loss. ANESTHESIA:  General.    PROCEDURE PERFORMED:  Bilateral myringotomy and ventilating tube  insertion. SURGEON:  Gilda Mason MD.    SCRUB NURSE:  Charlie Cotton. ANESTHESIOLOGIST:  Dr. Jocelin Kingsley. CLINICAL INDICATIONS:  This is a 59-year-old white female, who was  initially referred to me by Dr. Aniyah Locke, due to a long-standing  history of intermittent hearing loss. She had been diagnosed to have  sensorineural hearing loss in the past and lately the hearing loss has  become more accentuated. It was, therefore, decided that an evaluation  be done in the office. It showed that she had bilateral middle ear  effusion, characterized by air fluid level. She was given some  antibiotic without any resolution. It was, therefore, decided that she  undergo the aforementioned surgical intervention. OPERATIVE PROCEDURE:  The patient was placed in supine position and  general anesthesia utilizing an LMA route was satisfactorily inducted. The right ear was inspected under the microscope with the magnification  of 250X lens. There was minimal impacted cerumen and this was cleaned  with both air loop and alligator forceps.   There was some difficulty in  viewing the tympanic membrane due to the overhanging bone at the  anterior wall of the canal.

## 2019-08-28 ENCOUNTER — OFFICE VISIT (OUTPATIENT)
Dept: FAMILY MEDICINE CLINIC | Age: 84
End: 2019-08-28
Payer: MEDICARE

## 2019-08-28 VITALS
DIASTOLIC BLOOD PRESSURE: 80 MMHG | SYSTOLIC BLOOD PRESSURE: 124 MMHG | OXYGEN SATURATION: 96 % | HEART RATE: 69 BPM | HEIGHT: 55 IN | TEMPERATURE: 97.9 F | BODY MASS INDEX: 31.38 KG/M2 | WEIGHT: 135.6 LBS

## 2019-08-28 DIAGNOSIS — Z01.818 PRE-OP EXAM: ICD-10-CM

## 2019-08-28 DIAGNOSIS — Z01.818 PRE-OP EXAM: Primary | ICD-10-CM

## 2019-08-28 DIAGNOSIS — H90.6 MIXED CONDUCTIVE AND SENSORINEURAL HEARING LOSS OF BOTH EARS: ICD-10-CM

## 2019-08-28 LAB
ALBUMIN SERPL-MCNC: 3.7 G/DL (ref 3.5–4.6)
ALP BLD-CCNC: 74 U/L (ref 40–130)
ALT SERPL-CCNC: 19 U/L (ref 0–33)
ANION GAP SERPL CALCULATED.3IONS-SCNC: 15 MEQ/L (ref 9–15)
APTT: 23.6 SEC (ref 24.4–36.8)
AST SERPL-CCNC: 21 U/L (ref 0–35)
BILIRUB SERPL-MCNC: 0.3 MG/DL (ref 0.2–0.7)
BUN BLDV-MCNC: 15 MG/DL (ref 8–23)
CALCIUM SERPL-MCNC: 8.9 MG/DL (ref 8.5–9.9)
CHLORIDE BLD-SCNC: 94 MEQ/L (ref 95–107)
CO2: 34 MEQ/L (ref 20–31)
CREAT SERPL-MCNC: 0.76 MG/DL (ref 0.5–0.9)
GFR AFRICAN AMERICAN: >60
GFR NON-AFRICAN AMERICAN: >60
GLOBULIN: 3.5 G/DL (ref 2.3–3.5)
GLUCOSE BLD-MCNC: 86 MG/DL (ref 70–99)
HCT VFR BLD CALC: 30.6 % (ref 37–47)
HEMOGLOBIN: 10 G/DL (ref 12–16)
INR BLD: 1
MCH RBC QN AUTO: 30 PG (ref 27–31.3)
MCHC RBC AUTO-ENTMCNC: 32.7 % (ref 33–37)
MCV RBC AUTO: 91.6 FL (ref 82–100)
PDW BLD-RTO: 17.2 % (ref 11.5–14.5)
PLATELET # BLD: 206 K/UL (ref 130–400)
POTASSIUM SERPL-SCNC: 3.9 MEQ/L (ref 3.4–4.9)
PROTHROMBIN TIME: 13.5 SEC (ref 12.3–14.9)
RBC # BLD: 3.34 M/UL (ref 4.2–5.4)
SODIUM BLD-SCNC: 143 MEQ/L (ref 135–144)
TOTAL PROTEIN: 7.2 G/DL (ref 6.3–8)
WBC # BLD: 7 K/UL (ref 4.8–10.8)

## 2019-08-28 PROCEDURE — 93000 ELECTROCARDIOGRAM COMPLETE: CPT | Performed by: NURSE PRACTITIONER

## 2019-08-28 PROCEDURE — 99213 OFFICE O/P EST LOW 20 MIN: CPT | Performed by: NURSE PRACTITIONER

## 2019-08-28 RX ORDER — DILTIAZEM HYDROCHLORIDE 180 MG/1
180 CAPSULE, EXTENDED RELEASE ORAL DAILY
COMMUNITY

## 2019-08-28 RX ORDER — AMIODARONE HYDROCHLORIDE 100 MG/1
100 TABLET ORAL DAILY
COMMUNITY

## 2019-08-28 ASSESSMENT — PATIENT HEALTH QUESTIONNAIRE - PHQ9
SUM OF ALL RESPONSES TO PHQ QUESTIONS 1-9: 0
SUM OF ALL RESPONSES TO PHQ9 QUESTIONS 1 & 2: 0
SUM OF ALL RESPONSES TO PHQ QUESTIONS 1-9: 0
1. LITTLE INTEREST OR PLEASURE IN DOING THINGS: 0
2. FEELING DOWN, DEPRESSED OR HOPELESS: 0

## 2019-08-28 NOTE — PATIENT INSTRUCTIONS
Important Patient Instructions:  1. Nothing to eat or drink after midnight the night before surgery. 2. You will only take your carvedilol (coreg) the morning of surgery with a sip of water. You will not take the rest of your medications that morning. You can resume taking those medications once the doctor has advised you to do so. Patient Education        Hearing Loss: Care Instructions  Your Care Instructions    Hearing loss is a sudden or slow decrease in how well you hear. It can range from mild to severe. Permanent hearing loss can occur with aging. It also can happen when you are exposed long-term to loud noise. Examples include listening to loud music, riding motorcycles, or being around other loud machines. Hearing loss can affect your work and home life. It can make you feel lonely or depressed. You may feel that you have lost your independence. But hearing aids and other devices can help you hear better and feel connected to others. Follow-up care is a key part of your treatment and safety. Be sure to make and go to all appointments, and call your doctor if you are having problems. It's also a good idea to know your test results and keep a list of the medicines you take. How can you care for yourself at home? · Avoid loud noises whenever possible. This helps keep your hearing from getting worse. · Always wear hearing protection around loud noises. · Wear a hearing aid as directed. See a person who can help you pick a hearing aid that fits you. · Have hearing tests as your doctor suggests. They can show whether your hearing has changed. Your hearing aid may need to be adjusted. · Use other devices as needed. These may include:  ? Telephone amplifiers and hearing aids that can connect to a television, stereo, radio, or microphone. ? Devices that use lights or vibrations. These alert you to the doorbell, a ringing telephone, or a baby monitor. ? Television closed-captioning.  This shows the words

## 2019-08-28 NOTE — PROGRESS NOTES
Preoperative Consultation      Christoph Johnson  YOB: 1925    Date of Service:  8/28/2019    Vitals:    08/28/19 1143   BP: 124/80   Site: Left Upper Arm   Position: Sitting   Cuff Size: Medium Adult   Pulse: 69   Temp: 97.9 °F (36.6 °C)   TempSrc: Tympanic   SpO2: 96%   Weight: 135 lb 9.6 oz (61.5 kg)   Height: 4' 7\" (1.397 m)      Wt Readings from Last 2 Encounters:   08/28/19 135 lb 9.6 oz (61.5 kg)   06/25/19 133 lb (60.3 kg)     BP Readings from Last 3 Encounters:   08/28/19 124/80   06/25/19 (!) 157/69   06/27/19 (!) 148/67        Chief Complaint   Patient presents with   Keya Larios Pre-op Exam     pt has a small crack in her leg that is leaking a water like fluid , no other concerns at this time. Allergies   Allergen Reactions    Adhesive Tape Other (See Comments)     Skin irritation     Outpatient Medications Marked as Taking for the 8/28/19 encounter (Office Visit) with DIMITRI Christopher CNP   Medication Sig Dispense Refill    amiodarone (PACERONE) 100 MG tablet Take 100 mg by mouth daily      diltiazem (DILACOR XR) 180 MG extended release capsule Take 180 mg by mouth daily      furosemide (LASIX) 20 MG tablet Take 20 mg by mouth daily      vitamin D (CHOLECALCIFEROL) 1000 UNIT TABS tablet Take 1,000 Units by mouth daily      lidocaine (LIDODERM) 5 % Place 1 patch onto the skin daily 12 hours on, 12 hours off.  acetaminophen (TYLENOL) 325 MG tablet Take 650 mg by mouth every 6 hours as needed for Pain      tamoxifen (NOLVADEX) 10 MG tablet Take 1 tablet by mouth 2 times daily 180 tablet 3    albuterol (PROVENTIL) (2.5 MG/3ML) 0.083% nebulizer solution Take 2.5 mg by nebulization every 6 hours as needed.  carvedilol (COREG) 25 MG tablet Take 25 mg by mouth 2 times daily (with meals).  lovastatin (MEVACOR) 40 MG tablet Take 40 mg by mouth nightly.  nitroGLYCERIN (NITROSTAT) 0.4 MG SL tablet Place 0.4 mg under the tongue every 5 minutes as needed.      

## 2019-08-29 ENCOUNTER — HOSPITAL ENCOUNTER (OUTPATIENT)
Age: 84
Setting detail: OUTPATIENT SURGERY
Discharge: HOME | End: 2019-08-29
Attending: OTOLARYNGOLOGY | Admitting: OTOLARYNGOLOGY
Payer: MEDICARE

## 2019-08-29 ENCOUNTER — ANESTHESIA EVENT (OUTPATIENT)
Dept: OPERATING ROOM | Age: 84
End: 2019-08-29
Payer: MEDICARE

## 2019-08-29 ENCOUNTER — ANESTHESIA (OUTPATIENT)
Dept: OPERATING ROOM | Age: 84
End: 2019-08-29
Payer: MEDICARE

## 2019-08-29 VITALS
BODY MASS INDEX: 31.24 KG/M2 | DIASTOLIC BLOOD PRESSURE: 60 MMHG | HEART RATE: 62 BPM | WEIGHT: 135 LBS | HEIGHT: 55 IN | SYSTOLIC BLOOD PRESSURE: 128 MMHG | RESPIRATION RATE: 16 BRPM | TEMPERATURE: 97 F | OXYGEN SATURATION: 97 %

## 2019-08-29 VITALS — DIASTOLIC BLOOD PRESSURE: 55 MMHG | SYSTOLIC BLOOD PRESSURE: 91 MMHG | OXYGEN SATURATION: 100 %

## 2019-08-29 PROCEDURE — 3600000002 HC SURGERY LEVEL 2 BASE: Performed by: OTOLARYNGOLOGY

## 2019-08-29 PROCEDURE — 3600000012 HC SURGERY LEVEL 2 ADDTL 15MIN: Performed by: OTOLARYNGOLOGY

## 2019-08-29 PROCEDURE — 3700000000 HC ANESTHESIA ATTENDED CARE: Performed by: OTOLARYNGOLOGY

## 2019-08-29 PROCEDURE — 7100000010 HC PHASE II RECOVERY - FIRST 15 MIN: Performed by: OTOLARYNGOLOGY

## 2019-08-29 PROCEDURE — 3700000001 HC ADD 15 MINUTES (ANESTHESIA): Performed by: OTOLARYNGOLOGY

## 2019-08-29 PROCEDURE — 6360000002 HC RX W HCPCS: Performed by: NURSE ANESTHETIST, CERTIFIED REGISTERED

## 2019-08-29 PROCEDURE — 2780000010 HC IMPLANT OTHER: Performed by: OTOLARYNGOLOGY

## 2019-08-29 PROCEDURE — 2500000003 HC RX 250 WO HCPCS: Performed by: NURSE ANESTHETIST, CERTIFIED REGISTERED

## 2019-08-29 PROCEDURE — 7100000011 HC PHASE II RECOVERY - ADDTL 15 MIN: Performed by: OTOLARYNGOLOGY

## 2019-08-29 PROCEDURE — 2580000003 HC RX 258: Performed by: NURSE PRACTITIONER

## 2019-08-29 PROCEDURE — 7100000000 HC PACU RECOVERY - FIRST 15 MIN: Performed by: OTOLARYNGOLOGY

## 2019-08-29 PROCEDURE — 6370000000 HC RX 637 (ALT 250 FOR IP): Performed by: OTOLARYNGOLOGY

## 2019-08-29 PROCEDURE — 2709999900 HC NON-CHARGEABLE SUPPLY: Performed by: OTOLARYNGOLOGY

## 2019-08-29 PROCEDURE — 7100000001 HC PACU RECOVERY - ADDTL 15 MIN: Performed by: OTOLARYNGOLOGY

## 2019-08-29 DEVICE — VENT TUBE 24501 5PK ARMSTRNG GROM 1.14MM
Type: IMPLANTABLE DEVICE | Site: EAR | Status: FUNCTIONAL
Brand: ARMSTRONG

## 2019-08-29 RX ORDER — LIDOCAINE HYDROCHLORIDE 20 MG/ML
INJECTION, SOLUTION INTRAVENOUS PRN
Status: DISCONTINUED | OUTPATIENT
Start: 2019-08-29 | End: 2019-08-29 | Stop reason: SDUPTHER

## 2019-08-29 RX ORDER — DEXAMETHASONE SODIUM PHOSPHATE 10 MG/ML
INJECTION INTRAMUSCULAR; INTRAVENOUS PRN
Status: DISCONTINUED | OUTPATIENT
Start: 2019-08-29 | End: 2019-08-29 | Stop reason: SDUPTHER

## 2019-08-29 RX ORDER — SODIUM CHLORIDE, SODIUM LACTATE, POTASSIUM CHLORIDE, CALCIUM CHLORIDE 600; 310; 30; 20 MG/100ML; MG/100ML; MG/100ML; MG/100ML
INJECTION, SOLUTION INTRAVENOUS CONTINUOUS
Status: DISCONTINUED | OUTPATIENT
Start: 2019-08-29 | End: 2019-08-29 | Stop reason: HOSPADM

## 2019-08-29 RX ORDER — ONDANSETRON 2 MG/ML
INJECTION INTRAMUSCULAR; INTRAVENOUS PRN
Status: DISCONTINUED | OUTPATIENT
Start: 2019-08-29 | End: 2019-08-29 | Stop reason: SDUPTHER

## 2019-08-29 RX ORDER — SODIUM CHLORIDE 0.9 % (FLUSH) 0.9 %
10 SYRINGE (ML) INJECTION EVERY 12 HOURS SCHEDULED
Status: DISCONTINUED | OUTPATIENT
Start: 2019-08-29 | End: 2019-08-29 | Stop reason: HOSPADM

## 2019-08-29 RX ORDER — SODIUM CHLORIDE 0.9 % (FLUSH) 0.9 %
10 SYRINGE (ML) INJECTION PRN
Status: DISCONTINUED | OUTPATIENT
Start: 2019-08-29 | End: 2019-08-29 | Stop reason: HOSPADM

## 2019-08-29 RX ORDER — PROPOFOL 10 MG/ML
INJECTION, EMULSION INTRAVENOUS PRN
Status: DISCONTINUED | OUTPATIENT
Start: 2019-08-29 | End: 2019-08-29 | Stop reason: SDUPTHER

## 2019-08-29 RX ORDER — ONDANSETRON 2 MG/ML
4 INJECTION INTRAMUSCULAR; INTRAVENOUS
Status: DISCONTINUED | OUTPATIENT
Start: 2019-08-29 | End: 2019-08-29 | Stop reason: HOSPADM

## 2019-08-29 RX ORDER — EPHEDRINE SULFATE/0.9% NACL/PF 50 MG/5 ML
SYRINGE (ML) INTRAVENOUS PRN
Status: DISCONTINUED | OUTPATIENT
Start: 2019-08-29 | End: 2019-08-29 | Stop reason: SDUPTHER

## 2019-08-29 RX ORDER — LIDOCAINE HYDROCHLORIDE 10 MG/ML
1 INJECTION, SOLUTION EPIDURAL; INFILTRATION; INTRACAUDAL; PERINEURAL
Status: DISCONTINUED | OUTPATIENT
Start: 2019-08-29 | End: 2019-08-29 | Stop reason: HOSPADM

## 2019-08-29 RX ADMIN — DEXAMETHASONE SODIUM PHOSPHATE 10 MG: 10 INJECTION INTRAMUSCULAR; INTRAVENOUS at 09:38

## 2019-08-29 RX ADMIN — PROPOFOL 70 MG: 10 INJECTION, EMULSION INTRAVENOUS at 09:38

## 2019-08-29 RX ADMIN — LIDOCAINE HYDROCHLORIDE 40 MG: 20 INJECTION, SOLUTION INTRAVENOUS at 09:38

## 2019-08-29 RX ADMIN — SODIUM CHLORIDE, POTASSIUM CHLORIDE, SODIUM LACTATE AND CALCIUM CHLORIDE 125 ML/HR: 600; 310; 30; 20 INJECTION, SOLUTION INTRAVENOUS at 09:00

## 2019-08-29 RX ADMIN — Medication 10 MG: at 09:52

## 2019-08-29 RX ADMIN — ONDANSETRON 4 MG: 2 INJECTION INTRAMUSCULAR; INTRAVENOUS at 09:38

## 2019-08-29 ASSESSMENT — ENCOUNTER SYMPTOMS: SHORTNESS OF BREATH: 1

## 2019-08-29 ASSESSMENT — PULMONARY FUNCTION TESTS
PIF_VALUE: 1
PIF_VALUE: 10
PIF_VALUE: 10
PIF_VALUE: 9
PIF_VALUE: 10
PIF_VALUE: 1
PIF_VALUE: 3
PIF_VALUE: 10
PIF_VALUE: 1
PIF_VALUE: 10
PIF_VALUE: 0
PIF_VALUE: 1
PIF_VALUE: 10
PIF_VALUE: 1
PIF_VALUE: 10
PIF_VALUE: 13
PIF_VALUE: 10
PIF_VALUE: 0

## 2019-08-29 ASSESSMENT — PAIN SCALES - GENERAL: PAINLEVEL_OUTOF10: 0

## 2019-08-29 ASSESSMENT — PAIN - FUNCTIONAL ASSESSMENT: PAIN_FUNCTIONAL_ASSESSMENT: 0-10

## 2019-08-29 NOTE — ANESTHESIA PRE PROCEDURE
Department of Anesthesiology  Preprocedure Note       Name:  Vickey Beltre   Age:  80 y.o.  :  1925                                          MRN:  [id number]         Date:  2019      Surgeon: Toñito Lindquist):  Nani Chamberlain MD    Procedure: RIGHT MYRINGOTOMY WITH VENTILATING  TUBE INSERTION (PAT AT [facility]) (Right )    Medications prior to admission:   Prior to Admission medications    Medication Sig Start Date End Date Taking? Authorizing Provider   amiodarone (PACERONE) 100 MG tablet Take 100 mg by mouth daily    Historical Provider, MD   diltiazem (DILACOR XR) 180 MG extended release capsule Take 180 mg by mouth daily    Historical Provider, MD   furosemide (LASIX) 20 MG tablet Take 20 mg by mouth daily    Historical Provider, MD   vitamin D (CHOLECALCIFEROL) 1000 UNIT TABS tablet Take 1,000 Units by mouth daily    Historical Provider, MD   lidocaine (LIDODERM) 5 % Place 1 patch onto the skin daily 12 hours on, 12 hours off. Historical Provider, MD   acetaminophen (TYLENOL) 325 MG tablet Take 650 mg by mouth every 6 hours as needed for Pain    Historical Provider, MD   tamoxifen (NOLVADEX) 10 MG tablet Take 1 tablet by mouth 2 times daily 18   Kate Padilla MD   albuterol (PROVENTIL) (2.5 MG/3ML) 0.083% nebulizer solution Take 2.5 mg by nebulization every 6 hours as needed. Historical Provider, MD   carvedilol (COREG) 25 MG tablet Take 25 mg by mouth 2 times daily (with meals). Historical Provider, MD   lovastatin (MEVACOR) 40 MG tablet Take 40 mg by mouth nightly. Historical Provider, MD   nitroGLYCERIN (NITROSTAT) 0.4 MG SL tablet Place 0.4 mg under the tongue every 5 minutes as needed. Historical Provider, MD   Multiple Vitamins-Minerals (OCUVITE ADULT 50+ PO) Take  by mouth. Historical Provider, MD   ramipril (ALTACE) 10 MG capsule Take 10 mg by mouth every evening     Historical Provider, MD   omeprazole (PRILOSEC) 20 MG capsule Take 40 mg by mouth daily. Historical Provider, MD       Current medications:    Current Facility-Administered Medications   Medication Dose Route Frequency Provider Last Rate Last Dose    lactated ringers infusion   Intravenous Continuous Hemant Arch, APRN - CNP        sodium chloride flush 0.9 % injection 10 mL  10 mL Intravenous 2 times per day Hemant Arch, APRN - CNP        sodium chloride flush 0.9 % injection 10 mL  10 mL Intravenous PRN Hemant Arch, APRN - CNP        lidocaine PF 1 % injection 1 mL  1 mL Intradermal Once PRN Hemant Arch, APRN - CNP           Allergies:     Allergies   Allergen Reactions    Adhesive Tape Other (See Comments)     Skin irritation       Problem List:    Patient Active Problem List   Diagnosis Code    Dyspnea R06.00    Kyphoscoliosis M41.9    COPD (chronic obstructive pulmonary disease) ([facility] J44.9    Dyslipidemia E78.5    Hypertension I10    LBBB (left bundle branch block) I44.7    Malignant neoplasm of female breast ([facility] C50.18    ARMD (age related macular degeneration) H35.30    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    Bilateral hearing loss H91.93       Past Medical History:        Diagnosis Date    CAD (coronary artery disease)     has x 8 stents     Cancer ([facility] 2011    left breast cancer / OR excision / Tamoxifen    COPD (chronic obstructive pulmonary disease) (HCC)     never smoked    Dyslipidemia     Dyspnea     GERD (gastroesophageal reflux disease)     History of blood transfusion     at age [de-identified]    Hyperlipidemia     meds > 25 yrs    Hypertension     meds > 25 yrs    Kyphoscoliosis     LBBB (left bundle branch block)        Past Surgical History:        Procedure Laterality Date    ABDOMEN SURGERY  2014    intestinal OR due to a blockage    APPENDECTOMY      BLADDER SURGERY      suspension    BREAST SURGERY  2011    left breast OR due to cancer / tamoxifen    CARDIAC DEFIBRILLATOR PLACEMENT      COLONOSCOPY      EYE SURGERY Phaco with IOL OU    HYSTERECTOMY      MYRINGOTOMY AND TYMPANOSTOMY TUBE PLACEMENT Bilateral 6/27/2019    BILATERAL MYRINGOTOMY WITH VENTILATING  TUBE INSERTION performed by Leatha Engel MD at [place] [address] History:    Social History     Tobacco Use    Smoking status: Never Smoker    Smokeless tobacco: Never Used   Substance Use Topics    Alcohol use: Not Currently     Comment: holiday alcohol                                Counseling given: Not Answered      Vital Signs (Current):   Vitals:    08/28/19 1340 08/29/19 0812   BP:  (!) 158/70   Pulse:  65   Resp:  22   Temp:  97.7 °F (36.5 °C)   TempSrc:  Temporal   SpO2:  99%   Weight: 135 lb (61.2 kg)    Height: 4' 7\" (1.397 m)                                               BP Readings from Last 3 Encounters:   08/29/19 (!) 158/70   08/28/19 124/80   06/25/19 (!) 157/69       NPO Status: Time of last liquid consumption: 2200                        Time of last solid consumption: 1830                        Date of last liquid consumption: 08/28/19                        Date of last solid food consumption: 08/28/19    BMI:   Wt Readings from Last 3 Encounters:   08/28/19 135 lb (61.2 kg)   08/28/19 135 lb 9.6 oz (61.5 kg)   06/25/19 133 lb (60.3 kg)     Body mass index is 31.38 kg/m².     CBC:   Lab Results   Component Value Date    WBC 7.0 08/28/2019    RBC 3.34 08/28/2019    HGB 10.0 08/28/2019    HCT 30.6 08/28/2019    MCV 91.6 08/28/2019    RDW 17.2 08/28/2019     08/28/2019       CMP:   Lab Results   Component Value Date     08/28/2019    K 3.9 08/28/2019    CL 94 08/28/2019    CO2 34 08/28/2019    BUN 15 08/28/2019    CREATININE 0.76 08/28/2019    GFRAA >60.0 08/28/2019    LABGLOM >60.0 08/28/2019    GLUCOSE 86 08/28/2019    PROT 7.2 08/28/2019    CALCIUM 8.9 08/28/2019    BILITOT 0.3 08/28/2019    ALKPHOS 74 08/28/2019    AST 21 08/28/2019    ALT 19 08/28/2019       POC Tests: No results for input(s): POCGLU, POCNA, POCK, POCCL,

## 2019-12-17 ENCOUNTER — HOSPITAL ENCOUNTER (OUTPATIENT)
Dept: PREADMISSION TESTING | Age: 84
Discharge: HOME | End: 2019-12-21
Payer: MEDICARE

## 2019-12-17 ENCOUNTER — ANESTHESIA EVENT (OUTPATIENT)
Dept: OPERATING ROOM | Age: 84
End: 2019-12-17
Payer: MEDICARE

## 2019-12-17 VITALS
SYSTOLIC BLOOD PRESSURE: 75 MMHG | HEIGHT: 55 IN | DIASTOLIC BLOOD PRESSURE: 41 MMHG | HEART RATE: 58 BPM | OXYGEN SATURATION: 95 % | WEIGHT: 131.4 LBS | TEMPERATURE: 97.2 F | BODY MASS INDEX: 30.41 KG/M2 | RESPIRATION RATE: 16 BRPM

## 2019-12-17 DIAGNOSIS — H61.21 IMPACTED CERUMEN, RIGHT EAR: ICD-10-CM

## 2019-12-17 DIAGNOSIS — J31.2 PHARYNGITIS, CHRONIC: ICD-10-CM

## 2019-12-17 LAB
ANION GAP SERPL CALCULATED.3IONS-SCNC: 10 MEQ/L (ref 9–15)
BUN BLDV-MCNC: 25 MG/DL (ref 8–23)
CALCIUM SERPL-MCNC: 8.8 MG/DL (ref 8.5–9.9)
CHLORIDE BLD-SCNC: 95 MEQ/L (ref 95–107)
CO2: 34 MEQ/L (ref 20–31)
CREAT SERPL-MCNC: 0.85 MG/DL (ref 0.5–0.9)
GFR AFRICAN AMERICAN: >60
GFR NON-AFRICAN AMERICAN: >60
GLUCOSE BLD-MCNC: 114 MG/DL (ref 70–99)
HCT VFR BLD CALC: 23.8 % (ref 37–47)
HEMOGLOBIN: 8.2 G/DL (ref 12–16)
MCH RBC QN AUTO: 30.1 PG (ref 27–31.3)
MCHC RBC AUTO-ENTMCNC: 34.6 % (ref 33–37)
MCV RBC AUTO: 87.1 FL (ref 82–100)
PDW BLD-RTO: 14.8 % (ref 11.5–14.5)
PLATELET # BLD: 104 K/UL (ref 130–400)
POTASSIUM SERPL-SCNC: 3.7 MEQ/L (ref 3.4–4.9)
RBC # BLD: 2.74 M/UL (ref 4.2–5.4)
SODIUM BLD-SCNC: 139 MEQ/L (ref 135–144)
WBC # BLD: 2.6 K/UL (ref 4.8–10.8)

## 2019-12-17 PROCEDURE — 80048 BASIC METABOLIC PNL TOTAL CA: CPT

## 2019-12-17 PROCEDURE — 85027 COMPLETE CBC AUTOMATED: CPT

## 2019-12-17 RX ORDER — SODIUM CHLORIDE, SODIUM LACTATE, POTASSIUM CHLORIDE, CALCIUM CHLORIDE 600; 310; 30; 20 MG/100ML; MG/100ML; MG/100ML; MG/100ML
INJECTION, SOLUTION INTRAVENOUS CONTINUOUS
Status: CANCELLED | OUTPATIENT
Start: 2019-12-19

## 2019-12-17 RX ORDER — SODIUM CHLORIDE 0.9 % (FLUSH) 0.9 %
10 SYRINGE (ML) INJECTION PRN
Status: CANCELLED | OUTPATIENT
Start: 2019-12-19

## 2019-12-17 RX ORDER — SODIUM CHLORIDE 0.9 % (FLUSH) 0.9 %
10 SYRINGE (ML) INJECTION EVERY 12 HOURS SCHEDULED
Status: CANCELLED | OUTPATIENT
Start: 2019-12-19

## 2019-12-17 RX ORDER — LIDOCAINE HYDROCHLORIDE 10 MG/ML
1 INJECTION, SOLUTION EPIDURAL; INFILTRATION; INTRACAUDAL; PERINEURAL
Status: CANCELLED | OUTPATIENT
Start: 2019-12-19 | End: 2019-12-19

## 2019-12-17 ASSESSMENT — ENCOUNTER SYMPTOMS
STRIDOR: 0
COUGH: 0
TROUBLE SWALLOWING: 0
DIARRHEA: 0
EYES NEGATIVE: 1
VOMITING: 0
ABDOMINAL PAIN: 0
CONSTIPATION: 0
NAUSEA: 0
WHEEZING: 0
BACK PAIN: 0
SORE THROAT: 0
SHORTNESS OF BREATH: 0
CHEST TIGHTNESS: 0

## 2019-12-19 ENCOUNTER — HOSPITAL ENCOUNTER (OUTPATIENT)
Age: 84
Setting detail: OUTPATIENT SURGERY
Discharge: HOME | End: 2019-12-19
Attending: OTOLARYNGOLOGY | Admitting: OTOLARYNGOLOGY
Payer: MEDICARE

## 2019-12-19 ENCOUNTER — ANESTHESIA (OUTPATIENT)
Dept: OPERATING ROOM | Age: 84
End: 2019-12-19
Payer: MEDICARE

## 2019-12-19 VITALS
DIASTOLIC BLOOD PRESSURE: 59 MMHG | BODY MASS INDEX: 30.32 KG/M2 | SYSTOLIC BLOOD PRESSURE: 118 MMHG | RESPIRATION RATE: 16 BRPM | HEIGHT: 55 IN | WEIGHT: 131 LBS | OXYGEN SATURATION: 97 % | TEMPERATURE: 96.7 F | HEART RATE: 68 BPM

## 2019-12-19 VITALS — SYSTOLIC BLOOD PRESSURE: 137 MMHG | OXYGEN SATURATION: 100 % | DIASTOLIC BLOOD PRESSURE: 73 MMHG

## 2019-12-19 PROCEDURE — 2500000003 HC RX 250 WO HCPCS: Performed by: NURSE PRACTITIONER

## 2019-12-19 PROCEDURE — 2580000003 HC RX 258: Performed by: OTOLARYNGOLOGY

## 2019-12-19 PROCEDURE — 7100000011 HC PHASE II RECOVERY - ADDTL 15 MIN: Performed by: OTOLARYNGOLOGY

## 2019-12-19 PROCEDURE — 7100000010 HC PHASE II RECOVERY - FIRST 15 MIN: Performed by: OTOLARYNGOLOGY

## 2019-12-19 PROCEDURE — 7100000000 HC PACU RECOVERY - FIRST 15 MIN: Performed by: OTOLARYNGOLOGY

## 2019-12-19 PROCEDURE — 2500000003 HC RX 250 WO HCPCS: Performed by: OTOLARYNGOLOGY

## 2019-12-19 PROCEDURE — 3700000001 HC ADD 15 MINUTES (ANESTHESIA): Performed by: OTOLARYNGOLOGY

## 2019-12-19 PROCEDURE — 3600000013 HC SURGERY LEVEL 3 ADDTL 15MIN: Performed by: OTOLARYNGOLOGY

## 2019-12-19 PROCEDURE — 6370000000 HC RX 637 (ALT 250 FOR IP): Performed by: OTOLARYNGOLOGY

## 2019-12-19 PROCEDURE — 3700000000 HC ANESTHESIA ATTENDED CARE: Performed by: OTOLARYNGOLOGY

## 2019-12-19 PROCEDURE — 2500000003 HC RX 250 WO HCPCS: Performed by: NURSE ANESTHETIST, CERTIFIED REGISTERED

## 2019-12-19 PROCEDURE — 3600000003 HC SURGERY LEVEL 3 BASE: Performed by: OTOLARYNGOLOGY

## 2019-12-19 PROCEDURE — 2709999900 HC NON-CHARGEABLE SUPPLY: Performed by: OTOLARYNGOLOGY

## 2019-12-19 PROCEDURE — 7100000001 HC PACU RECOVERY - ADDTL 15 MIN: Performed by: OTOLARYNGOLOGY

## 2019-12-19 PROCEDURE — 6360000002 HC RX W HCPCS: Performed by: NURSE ANESTHETIST, CERTIFIED REGISTERED

## 2019-12-19 PROCEDURE — 2580000003 HC RX 258: Performed by: NURSE PRACTITIONER

## 2019-12-19 RX ORDER — SODIUM CHLORIDE 0.9 % (FLUSH) 0.9 %
10 SYRINGE (ML) INJECTION PRN
Status: DISCONTINUED | OUTPATIENT
Start: 2019-12-19 | End: 2019-12-19 | Stop reason: HOSPADM

## 2019-12-19 RX ORDER — MAGNESIUM HYDROXIDE 1200 MG/15ML
LIQUID ORAL CONTINUOUS PRN
Status: COMPLETED | OUTPATIENT
Start: 2019-12-19 | End: 2019-12-19

## 2019-12-19 RX ORDER — LIDOCAINE HYDROCHLORIDE 20 MG/ML
INJECTION, SOLUTION INFILTRATION; PERINEURAL PRN
Status: DISCONTINUED | OUTPATIENT
Start: 2019-12-19 | End: 2019-12-19 | Stop reason: SDUPTHER

## 2019-12-19 RX ORDER — ONDANSETRON 2 MG/ML
4 INJECTION INTRAMUSCULAR; INTRAVENOUS EVERY 6 HOURS PRN
Status: DISCONTINUED | OUTPATIENT
Start: 2019-12-19 | End: 2019-12-19 | Stop reason: HOSPADM

## 2019-12-19 RX ORDER — FENTANYL CITRATE 50 UG/ML
25 INJECTION, SOLUTION INTRAMUSCULAR; INTRAVENOUS EVERY 10 MIN PRN
Status: DISCONTINUED | OUTPATIENT
Start: 2019-12-19 | End: 2019-12-19 | Stop reason: HOSPADM

## 2019-12-19 RX ORDER — SODIUM CHLORIDE 0.9 % (FLUSH) 0.9 %
10 SYRINGE (ML) INJECTION EVERY 12 HOURS SCHEDULED
Status: DISCONTINUED | OUTPATIENT
Start: 2019-12-19 | End: 2019-12-19 | Stop reason: HOSPADM

## 2019-12-19 RX ORDER — DEXTROSE, SODIUM CHLORIDE, AND POTASSIUM CHLORIDE 5; .45; .15 G/100ML; G/100ML; G/100ML
INJECTION INTRAVENOUS CONTINUOUS
Status: DISCONTINUED | OUTPATIENT
Start: 2019-12-19 | End: 2019-12-19 | Stop reason: HOSPADM

## 2019-12-19 RX ORDER — PROPOFOL 10 MG/ML
INJECTION, EMULSION INTRAVENOUS PRN
Status: DISCONTINUED | OUTPATIENT
Start: 2019-12-19 | End: 2019-12-19 | Stop reason: SDUPTHER

## 2019-12-19 RX ORDER — LIDOCAINE HYDROCHLORIDE 10 MG/ML
1 INJECTION, SOLUTION EPIDURAL; INFILTRATION; INTRACAUDAL; PERINEURAL
Status: COMPLETED | OUTPATIENT
Start: 2019-12-19 | End: 2019-12-19

## 2019-12-19 RX ORDER — DIPHENHYDRAMINE HYDROCHLORIDE 50 MG/ML
12.5 INJECTION INTRAMUSCULAR; INTRAVENOUS
Status: DISCONTINUED | OUTPATIENT
Start: 2019-12-19 | End: 2019-12-19 | Stop reason: HOSPADM

## 2019-12-19 RX ORDER — MEPERIDINE HYDROCHLORIDE 25 MG/ML
12.5 INJECTION INTRAMUSCULAR; INTRAVENOUS; SUBCUTANEOUS EVERY 5 MIN PRN
Status: DISCONTINUED | OUTPATIENT
Start: 2019-12-19 | End: 2019-12-19 | Stop reason: HOSPADM

## 2019-12-19 RX ORDER — ONDANSETRON 2 MG/ML
INJECTION INTRAMUSCULAR; INTRAVENOUS PRN
Status: DISCONTINUED | OUTPATIENT
Start: 2019-12-19 | End: 2019-12-19 | Stop reason: SDUPTHER

## 2019-12-19 RX ORDER — DEXAMETHASONE SODIUM PHOSPHATE 10 MG/ML
INJECTION INTRAMUSCULAR; INTRAVENOUS PRN
Status: DISCONTINUED | OUTPATIENT
Start: 2019-12-19 | End: 2019-12-19 | Stop reason: SDUPTHER

## 2019-12-19 RX ORDER — SODIUM CHLORIDE, SODIUM LACTATE, POTASSIUM CHLORIDE, CALCIUM CHLORIDE 600; 310; 30; 20 MG/100ML; MG/100ML; MG/100ML; MG/100ML
INJECTION, SOLUTION INTRAVENOUS CONTINUOUS
Status: DISCONTINUED | OUTPATIENT
Start: 2019-12-19 | End: 2019-12-19 | Stop reason: HOSPADM

## 2019-12-19 RX ORDER — METOCLOPRAMIDE HYDROCHLORIDE 5 MG/ML
10 INJECTION INTRAMUSCULAR; INTRAVENOUS
Status: DISCONTINUED | OUTPATIENT
Start: 2019-12-19 | End: 2019-12-19 | Stop reason: HOSPADM

## 2019-12-19 RX ORDER — ROCURONIUM BROMIDE 10 MG/ML
INJECTION, SOLUTION INTRAVENOUS PRN
Status: DISCONTINUED | OUTPATIENT
Start: 2019-12-19 | End: 2019-12-19 | Stop reason: SDUPTHER

## 2019-12-19 RX ORDER — FENTANYL CITRATE 50 UG/ML
INJECTION, SOLUTION INTRAMUSCULAR; INTRAVENOUS PRN
Status: DISCONTINUED | OUTPATIENT
Start: 2019-12-19 | End: 2019-12-19 | Stop reason: SDUPTHER

## 2019-12-19 RX ORDER — ONDANSETRON 2 MG/ML
4 INJECTION INTRAMUSCULAR; INTRAVENOUS
Status: DISCONTINUED | OUTPATIENT
Start: 2019-12-19 | End: 2019-12-19 | Stop reason: HOSPADM

## 2019-12-19 RX ADMIN — ROCURONIUM BROMIDE 40 MG: 10 INJECTION INTRAVENOUS at 10:16

## 2019-12-19 RX ADMIN — LIDOCAINE HYDROCHLORIDE 10 MG: 20 INJECTION, SOLUTION INFILTRATION; PERINEURAL at 10:16

## 2019-12-19 RX ADMIN — LIDOCAINE HYDROCHLORIDE 0.1 ML: 10 INJECTION, SOLUTION EPIDURAL; INFILTRATION; INTRACAUDAL; PERINEURAL at 08:57

## 2019-12-19 RX ADMIN — ONDANSETRON 4 MG: 2 INJECTION INTRAMUSCULAR; INTRAVENOUS at 10:35

## 2019-12-19 RX ADMIN — PROPOFOL 50 MG: 10 INJECTION, EMULSION INTRAVENOUS at 10:16

## 2019-12-19 RX ADMIN — SUGAMMADEX 200 MG: 100 INJECTION, SOLUTION INTRAVENOUS at 10:49

## 2019-12-19 RX ADMIN — FENTANYL CITRATE 25 MCG: 50 INJECTION, SOLUTION INTRAMUSCULAR; INTRAVENOUS at 10:17

## 2019-12-19 RX ADMIN — SODIUM CHLORIDE, POTASSIUM CHLORIDE, SODIUM LACTATE AND CALCIUM CHLORIDE 1000 ML: 600; 310; 30; 20 INJECTION, SOLUTION INTRAVENOUS at 08:58

## 2019-12-19 RX ADMIN — SODIUM CHLORIDE, POTASSIUM CHLORIDE, SODIUM LACTATE AND CALCIUM CHLORIDE: 600; 310; 30; 20 INJECTION, SOLUTION INTRAVENOUS at 10:09

## 2019-12-19 RX ADMIN — DEXAMETHASONE SODIUM PHOSPHATE 6 MG: 10 INJECTION INTRAMUSCULAR; INTRAVENOUS at 10:23

## 2019-12-19 ASSESSMENT — PULMONARY FUNCTION TESTS
PIF_VALUE: 1
PIF_VALUE: 1
PIF_VALUE: 25
PIF_VALUE: 28
PIF_VALUE: 26
PIF_VALUE: 28
PIF_VALUE: 25
PIF_VALUE: 3
PIF_VALUE: 25
PIF_VALUE: 27
PIF_VALUE: 26
PIF_VALUE: 1
PIF_VALUE: 1
PIF_VALUE: 25
PIF_VALUE: 16
PIF_VALUE: 1
PIF_VALUE: 1
PIF_VALUE: 27
PIF_VALUE: 26
PIF_VALUE: 28
PIF_VALUE: 28
PIF_VALUE: 1
PIF_VALUE: 25
PIF_VALUE: 27
PIF_VALUE: 2
PIF_VALUE: 24
PIF_VALUE: 1
PIF_VALUE: 26
PIF_VALUE: 28
PIF_VALUE: 26
PIF_VALUE: 26
PIF_VALUE: 18
PIF_VALUE: 24
PIF_VALUE: 28
PIF_VALUE: 1
PIF_VALUE: 1
PIF_VALUE: 25
PIF_VALUE: 2
PIF_VALUE: 27
PIF_VALUE: 26
PIF_VALUE: 1
PIF_VALUE: 27
PIF_VALUE: 27
PIF_VALUE: 28
PIF_VALUE: 29
PIF_VALUE: 27
PIF_VALUE: 26
PIF_VALUE: 1

## 2019-12-19 ASSESSMENT — COPD QUESTIONNAIRES: CAT_SEVERITY: MODERATE

## 2019-12-19 ASSESSMENT — ENCOUNTER SYMPTOMS: SHORTNESS OF BREATH: 1

## (undated) DEVICE — PAD N ADH W3XL4IN POLY COT SFT PERF FLM EASILY CUT ABSRB

## (undated) DEVICE — TUBING, SUCTION, 1/4" X 10', STRAIGHT: Brand: MEDLINE

## (undated) DEVICE — TOWEL,OR,DSP,ST,BLUE,STD,4/PK,20PK/CS: Brand: MEDLINE

## (undated) DEVICE — KIT PHENOL APPL UNIQUE FOAM TIP FOR ANES INCIS SITE DISP

## (undated) DEVICE — HYPODERMIC SAFETY NEEDLE: Brand: MAGELLAN

## (undated) DEVICE — MARKER SURG SKIN GENTIAN VLT REG TIP W/ 6IN RUL

## (undated) DEVICE — VIAL ACCESS CANNULA,SMART TIP: Brand: MONOJECT

## (undated) DEVICE — COVER,MAYO STAND,STERILE: Brand: MEDLINE

## (undated) DEVICE — GLOVE ORANGE PI 7 1/2   MSG9075

## (undated) DEVICE — DISSECTOR LAP DIA5MM BLNT TIP ENDOPATH

## (undated) DEVICE — LABEL MED MINI W/ MARKER

## (undated) DEVICE — CONTAINER,SPECIMEN,OR STERILE,4OZ: Brand: MEDLINE

## (undated) DEVICE — PACK,SET UP,DRAPE: Brand: MEDLINE

## (undated) DEVICE — SHEET,DRAPE,53X77,STERILE: Brand: MEDLINE

## (undated) DEVICE — GUARD TEETH AD NYL FOR LARYNSCP POS PROTCT

## (undated) DEVICE — 4-PORT MANIFOLD: Brand: NEPTUNE 2

## (undated) DEVICE — Device

## (undated) DEVICE — GAUZE,SPONGE,4"X4",16PLY,XRAY,STRL,LF: Brand: MEDLINE